# Patient Record
Sex: MALE | Race: BLACK OR AFRICAN AMERICAN | NOT HISPANIC OR LATINO | URBAN - METROPOLITAN AREA
[De-identification: names, ages, dates, MRNs, and addresses within clinical notes are randomized per-mention and may not be internally consistent; named-entity substitution may affect disease eponyms.]

---

## 2020-09-26 ENCOUNTER — INPATIENT (INPATIENT)
Facility: HOSPITAL | Age: 35
LOS: 1 days | Discharge: HOME | End: 2020-09-28
Attending: STUDENT IN AN ORGANIZED HEALTH CARE EDUCATION/TRAINING PROGRAM | Admitting: STUDENT IN AN ORGANIZED HEALTH CARE EDUCATION/TRAINING PROGRAM
Payer: MEDICAID

## 2020-09-26 VITALS
SYSTOLIC BLOOD PRESSURE: 138 MMHG | TEMPERATURE: 99 F | RESPIRATION RATE: 18 BRPM | OXYGEN SATURATION: 97 % | HEART RATE: 99 BPM | DIASTOLIC BLOOD PRESSURE: 80 MMHG

## 2020-09-26 DIAGNOSIS — M54.16 RADICULOPATHY, LUMBAR REGION: ICD-10-CM

## 2020-09-26 LAB
ALBUMIN SERPL ELPH-MCNC: 4.7 G/DL — SIGNIFICANT CHANGE UP (ref 3.5–5.2)
ALP SERPL-CCNC: 59 U/L — SIGNIFICANT CHANGE UP (ref 30–115)
ALT FLD-CCNC: 25 U/L — SIGNIFICANT CHANGE UP (ref 0–41)
ANION GAP SERPL CALC-SCNC: 8 MMOL/L — SIGNIFICANT CHANGE UP (ref 7–14)
APPEARANCE UR: ABNORMAL
AST SERPL-CCNC: 31 U/L — SIGNIFICANT CHANGE UP (ref 0–41)
BACTERIA # UR AUTO: NEGATIVE — SIGNIFICANT CHANGE UP
BASOPHILS # BLD AUTO: 0.06 K/UL — SIGNIFICANT CHANGE UP (ref 0–0.2)
BASOPHILS NFR BLD AUTO: 1 % — SIGNIFICANT CHANGE UP (ref 0–1)
BILIRUB SERPL-MCNC: 0.5 MG/DL — SIGNIFICANT CHANGE UP (ref 0.2–1.2)
BILIRUB UR-MCNC: NEGATIVE — SIGNIFICANT CHANGE UP
BUN SERPL-MCNC: 10 MG/DL — SIGNIFICANT CHANGE UP (ref 10–20)
CALCIUM SERPL-MCNC: 9.8 MG/DL — SIGNIFICANT CHANGE UP (ref 8.5–10.1)
CHLORIDE SERPL-SCNC: 105 MMOL/L — SIGNIFICANT CHANGE UP (ref 98–110)
CO2 SERPL-SCNC: 31 MMOL/L — SIGNIFICANT CHANGE UP (ref 17–32)
COLOR SPEC: YELLOW — SIGNIFICANT CHANGE UP
CREAT SERPL-MCNC: 1.1 MG/DL — SIGNIFICANT CHANGE UP (ref 0.7–1.5)
DIFF PNL FLD: NEGATIVE — SIGNIFICANT CHANGE UP
EOSINOPHIL # BLD AUTO: 0.09 K/UL — SIGNIFICANT CHANGE UP (ref 0–0.7)
EOSINOPHIL NFR BLD AUTO: 1.5 % — SIGNIFICANT CHANGE UP (ref 0–8)
EPI CELLS # UR: 1 /HPF — SIGNIFICANT CHANGE UP (ref 0–5)
ERYTHROCYTE [SEDIMENTATION RATE] IN BLOOD: 5 MM/HR — SIGNIFICANT CHANGE UP (ref 0–10)
GLUCOSE SERPL-MCNC: 87 MG/DL — SIGNIFICANT CHANGE UP (ref 70–99)
GLUCOSE UR QL: NEGATIVE — SIGNIFICANT CHANGE UP
HCT VFR BLD CALC: 46.9 % — SIGNIFICANT CHANGE UP (ref 42–52)
HGB BLD-MCNC: 15.2 G/DL — SIGNIFICANT CHANGE UP (ref 14–18)
HYALINE CASTS # UR AUTO: 17 /LPF — HIGH (ref 0–7)
IMM GRANULOCYTES NFR BLD AUTO: 0.2 % — SIGNIFICANT CHANGE UP (ref 0.1–0.3)
KETONES UR-MCNC: SIGNIFICANT CHANGE UP
LEUKOCYTE ESTERASE UR-ACNC: NEGATIVE — SIGNIFICANT CHANGE UP
LYMPHOCYTES # BLD AUTO: 1.7 K/UL — SIGNIFICANT CHANGE UP (ref 1.2–3.4)
LYMPHOCYTES # BLD AUTO: 27.6 % — SIGNIFICANT CHANGE UP (ref 20.5–51.1)
MCHC RBC-ENTMCNC: 30.7 PG — SIGNIFICANT CHANGE UP (ref 27–31)
MCHC RBC-ENTMCNC: 32.4 G/DL — SIGNIFICANT CHANGE UP (ref 32–37)
MCV RBC AUTO: 94.7 FL — HIGH (ref 80–94)
MONOCYTES # BLD AUTO: 0.63 K/UL — HIGH (ref 0.1–0.6)
MONOCYTES NFR BLD AUTO: 10.2 % — HIGH (ref 1.7–9.3)
NEUTROPHILS # BLD AUTO: 3.67 K/UL — SIGNIFICANT CHANGE UP (ref 1.4–6.5)
NEUTROPHILS NFR BLD AUTO: 59.5 % — SIGNIFICANT CHANGE UP (ref 42.2–75.2)
NITRITE UR-MCNC: NEGATIVE — SIGNIFICANT CHANGE UP
NRBC # BLD: 0 /100 WBCS — SIGNIFICANT CHANGE UP (ref 0–0)
PH UR: 6.5 — SIGNIFICANT CHANGE UP (ref 5–8)
PLATELET # BLD AUTO: 326 K/UL — SIGNIFICANT CHANGE UP (ref 130–400)
POTASSIUM SERPL-MCNC: 4.9 MMOL/L — SIGNIFICANT CHANGE UP (ref 3.5–5)
POTASSIUM SERPL-SCNC: 4.9 MMOL/L — SIGNIFICANT CHANGE UP (ref 3.5–5)
PROT SERPL-MCNC: 8.1 G/DL — HIGH (ref 6–8)
PROT UR-MCNC: ABNORMAL
RBC # BLD: 4.95 M/UL — SIGNIFICANT CHANGE UP (ref 4.7–6.1)
RBC # FLD: 12.1 % — SIGNIFICANT CHANGE UP (ref 11.5–14.5)
RBC CASTS # UR COMP ASSIST: 0 /HPF — SIGNIFICANT CHANGE UP (ref 0–4)
SODIUM SERPL-SCNC: 144 MMOL/L — SIGNIFICANT CHANGE UP (ref 135–146)
SP GR SPEC: 1.03 — HIGH (ref 1.01–1.03)
UROBILINOGEN FLD QL: ABNORMAL
WBC # BLD: 6.16 K/UL — SIGNIFICANT CHANGE UP (ref 4.8–10.8)
WBC # FLD AUTO: 6.16 K/UL — SIGNIFICANT CHANGE UP (ref 4.8–10.8)
WBC UR QL: 2 /HPF — SIGNIFICANT CHANGE UP (ref 0–5)

## 2020-09-26 PROCEDURE — 72125 CT NECK SPINE W/O DYE: CPT | Mod: 26

## 2020-09-26 PROCEDURE — 72131 CT LUMBAR SPINE W/O DYE: CPT | Mod: 26

## 2020-09-26 PROCEDURE — 99285 EMERGENCY DEPT VISIT HI MDM: CPT

## 2020-09-26 PROCEDURE — 72128 CT CHEST SPINE W/O DYE: CPT | Mod: 26

## 2020-09-26 RX ORDER — KETOROLAC TROMETHAMINE 30 MG/ML
30 SYRINGE (ML) INJECTION ONCE
Refills: 0 | Status: DISCONTINUED | OUTPATIENT
Start: 2020-09-26 | End: 2020-09-26

## 2020-09-26 RX ORDER — DEXAMETHASONE 0.5 MG/5ML
10 ELIXIR ORAL ONCE
Refills: 0 | Status: COMPLETED | OUTPATIENT
Start: 2020-09-26 | End: 2020-09-26

## 2020-09-26 RX ORDER — SODIUM CHLORIDE 9 MG/ML
1000 INJECTION INTRAMUSCULAR; INTRAVENOUS; SUBCUTANEOUS ONCE
Refills: 0 | Status: COMPLETED | OUTPATIENT
Start: 2020-09-26 | End: 2020-09-26

## 2020-09-26 RX ORDER — METHOCARBAMOL 500 MG/1
1500 TABLET, FILM COATED ORAL ONCE
Refills: 0 | Status: COMPLETED | OUTPATIENT
Start: 2020-09-26 | End: 2020-09-26

## 2020-09-26 RX ADMIN — Medication 30 MILLIGRAM(S): at 18:35

## 2020-09-26 RX ADMIN — SODIUM CHLORIDE 2000 MILLILITER(S): 9 INJECTION INTRAMUSCULAR; INTRAVENOUS; SUBCUTANEOUS at 18:35

## 2020-09-26 RX ADMIN — METHOCARBAMOL 1500 MILLIGRAM(S): 500 TABLET, FILM COATED ORAL at 22:00

## 2020-09-26 RX ADMIN — Medication 10 MILLIGRAM(S): at 22:00

## 2020-09-26 NOTE — ED PROVIDER NOTE - PROGRESS NOTE DETAILS
PP: Patient coming in with lumbar back pain, with sensory deficit to LLE, pending labs, CT. No saddle anesthesia and normal rectal tone. No hx of IVDU, back surgeries. Dispo as per results, reassessment, ambulation. Will sign patient out. Dr. Ferrera: Sign out to Dr. Huizar  Pt pending ct scan DL: Pt signed out to me by Dr. Adame. 33 yo M no reported PMHx presents to ED for acute shock like L sided lumbar back pain with decreased sensation to LLE. On my examination pt has TTP to L paraspinal muscles in lumbar region, as well as decreased sensation to LLE in L2 distrubution. Pending CT scan reads. ESR added to labs. DL: Spoke with Ishmael from neurosurgery, pt given decadron robaxin, will eval patient. Bhupendra: Received pt on signout. On reassesment, pt tender in the paraspinal lumbar area. Pt does complain of numbness in the L thigh area down to the knee. ESR and CT unremarkable. However, pt is still complaining of pain and numbness. Neurosurgery aware of pt. Will give pt robaxin and decadron.

## 2020-09-26 NOTE — ED PROVIDER NOTE - NS ED ROS FT
Review of Systems:  CONSTITUTIONAL - No fever  SKIN - No rash  HEMATOLOGIC - No abnormal bleeding or bruising  ENT - No neck pain  RESPIRATORY - No shortness of breath, No cough  CARDIAC -No chest pain, No palpitations  GI - No abdominal pain, No nausea, No vomiting, No diarrhea  - No dysuria, frequency, hematuria.  MUSCULOSKELETAL - No joint paint, No swelling  NEUROLOGIC - No numbness, No focal weakness, No headache, No dizziness  All other systems negative, unless specified in HPI

## 2020-09-26 NOTE — CONSULT NOTE ADULT - PROBLEM SELECTOR RECOMMENDATION 9
Decadron 10 mg IV x 1 now  Robaxin for spasm  Pt will need MRI of L/S spine w/o gadolinium  If pt refuses admission and is discharged he should go home on a Medrol dose juan and f/u with Dr Gilbert as outpt.

## 2020-09-26 NOTE — ED PROVIDER NOTE - PHYSICAL EXAMINATION
VITALS: Reviewed  CONSTITUTIONAL: well developed, well nourished, complaining of back pain, speaking in full sentences, nontoxic appearing  SKIN: warm, dry, no rash  HEAD: normocephalic, atraumatic  EYES: no conjunctival erythema, no nystagmus  ENT: patent airway, moist mucous membranes, no tongue deviation  NECK: supple, no masses, full flexion/extension without pain  CV:  regular rate, regular rhythm, 2+ radial pulses bilaterally  RESP: no wheezes, no rales, no rhonchi, normal work of breathing  ABD: soft, nontender, nondistended, no rebound, no guarding  MSK: normal ROM, no cyanosis, no edema, ++lumbar midspinal tenderness, no stepoffs  NEURO: alert, oriented, CN 2-12 grossly intact, sensation intact to light touch symmetrically EXCEPT LLE diminished, 5/5 motor strength in all extremities, normal finger to nose, normal rapid repetitive alternating movements, no pronator drift, no facial asymmetry, unable to assess gait due to pain   RECTAL: No saddle anesthesia, normal rectal tone (Chaperoned by Wanasingage Wanasingage PCA)   PSYCH: cooperative, appropriate

## 2020-09-26 NOTE — ED PROVIDER NOTE - ATTENDING CONTRIBUTION TO CARE
35 yo M presents to ED for back pain that started a few days ago. However, today he states the pain got worse. Today when he was walking he got a shooting pain in his back which caused him to fall to the ground. He has some decreased sensation to the L LE. No bladder or bowel incontinence or retention. Pt denies trauma.   No IVDU.  No other symptoms- SOB, CP, nausea, vomiting abdominal pain.     Const: Well nourished, well developed, appears stated age  Eyes: PERRL, no conjunctival injection  HENT:  Neck supple without meningismus   CV: RRR, Warm, well-perfused extremities  RESP: CTA B/L, no tachypnea   GI: soft, non-tender, non-distended  MSK: No gross deformities appreciated. Lumbar spinal and paraspinal pain.   Skin: Warm, dry. No rashes  Neuro: Alert, CNs II-XII grossly intact. Decreased sensation of L thigh     Will do labs, CT scan

## 2020-09-26 NOTE — ED PROVIDER NOTE - OBJECTIVE STATEMENT
34Y m with no sig PMH presents with lumbar back pain. Patient reports to having lumbar back pain for few days duration, unknown what triggered it, endorses to working out doing push ups, today while walking felt two stabbing sharp pain in the lumbar region 30 minutes prior to arrival, and he slowly fell to the ground on his knees. Endorses numbness to the LLE. Denies head trauma, no LOC, N/V. No hx of back surgery or IVDU. Denies fevers, chills, chest pain, SOB, abdominal pain, dysuria/hematuria. Subjectively denies saddle anesthesia.

## 2020-09-26 NOTE — ED ADULT NURSE NOTE - NSIMPLEMENTINTERV_GEN_ALL_ED
Implemented All Universal Safety Interventions:  Cooter to call system. Call bell, personal items and telephone within reach. Instruct patient to call for assistance. Room bathroom lighting operational. Non-slip footwear when patient is off stretcher. Physically safe environment: no spills, clutter or unnecessary equipment. Stretcher in lowest position, wheels locked, appropriate side rails in place.

## 2020-09-26 NOTE — ED PROVIDER NOTE - CLINICAL SUMMARY MEDICAL DECISION MAKING FREE TEXT BOX
34 yr old m that presents with back pain, and numbness. Labs and CT unremarkable. Pt evaluated by Neurosurgery whom recommended MRI. Pt admitted for MRI and pain management.

## 2020-09-26 NOTE — ED ADULT NURSE NOTE - OBJECTIVE STATEMENT
Pt. stated his back started hurting 3 days ago and got significantly worse today. C/o numbness down left leg. Denies urinary problems, cp, fever, chills.

## 2020-09-27 LAB
ANION GAP SERPL CALC-SCNC: 6 MMOL/L — LOW (ref 7–14)
BASOPHILS # BLD AUTO: 0.01 K/UL — SIGNIFICANT CHANGE UP (ref 0–0.2)
BASOPHILS NFR BLD AUTO: 0.1 % — SIGNIFICANT CHANGE UP (ref 0–1)
BUN SERPL-MCNC: 10 MG/DL — SIGNIFICANT CHANGE UP (ref 10–20)
CALCIUM SERPL-MCNC: 9.3 MG/DL — SIGNIFICANT CHANGE UP (ref 8.5–10.1)
CHLORIDE SERPL-SCNC: 105 MMOL/L — SIGNIFICANT CHANGE UP (ref 98–110)
CO2 SERPL-SCNC: 27 MMOL/L — SIGNIFICANT CHANGE UP (ref 17–32)
CREAT SERPL-MCNC: 0.8 MG/DL — SIGNIFICANT CHANGE UP (ref 0.7–1.5)
EOSINOPHIL # BLD AUTO: 0 K/UL — SIGNIFICANT CHANGE UP (ref 0–0.7)
EOSINOPHIL NFR BLD AUTO: 0 % — SIGNIFICANT CHANGE UP (ref 0–8)
GLUCOSE SERPL-MCNC: 143 MG/DL — HIGH (ref 70–99)
HCT VFR BLD CALC: 45.1 % — SIGNIFICANT CHANGE UP (ref 42–52)
HGB BLD-MCNC: 14.8 G/DL — SIGNIFICANT CHANGE UP (ref 14–18)
IMM GRANULOCYTES NFR BLD AUTO: 0.3 % — SIGNIFICANT CHANGE UP (ref 0.1–0.3)
LYMPHOCYTES # BLD AUTO: 0.8 K/UL — LOW (ref 1.2–3.4)
LYMPHOCYTES # BLD AUTO: 7.9 % — LOW (ref 20.5–51.1)
MAGNESIUM SERPL-MCNC: 2 MG/DL — SIGNIFICANT CHANGE UP (ref 1.8–2.4)
MCHC RBC-ENTMCNC: 31 PG — SIGNIFICANT CHANGE UP (ref 27–31)
MCHC RBC-ENTMCNC: 32.8 G/DL — SIGNIFICANT CHANGE UP (ref 32–37)
MCV RBC AUTO: 94.4 FL — HIGH (ref 80–94)
MONOCYTES # BLD AUTO: 0.2 K/UL — SIGNIFICANT CHANGE UP (ref 0.1–0.6)
MONOCYTES NFR BLD AUTO: 2 % — SIGNIFICANT CHANGE UP (ref 1.7–9.3)
NEUTROPHILS # BLD AUTO: 9.09 K/UL — HIGH (ref 1.4–6.5)
NEUTROPHILS NFR BLD AUTO: 89.7 % — HIGH (ref 42.2–75.2)
NRBC # BLD: 0 /100 WBCS — SIGNIFICANT CHANGE UP (ref 0–0)
PHOSPHATE SERPL-MCNC: 3.2 MG/DL — SIGNIFICANT CHANGE UP (ref 2.1–4.9)
PLATELET # BLD AUTO: 312 K/UL — SIGNIFICANT CHANGE UP (ref 130–400)
POTASSIUM SERPL-MCNC: 4.6 MMOL/L — SIGNIFICANT CHANGE UP (ref 3.5–5)
POTASSIUM SERPL-SCNC: 4.6 MMOL/L — SIGNIFICANT CHANGE UP (ref 3.5–5)
RBC # BLD: 4.78 M/UL — SIGNIFICANT CHANGE UP (ref 4.7–6.1)
RBC # FLD: 12 % — SIGNIFICANT CHANGE UP (ref 11.5–14.5)
SARS-COV-2 RNA SPEC QL NAA+PROBE: SIGNIFICANT CHANGE UP
SODIUM SERPL-SCNC: 138 MMOL/L — SIGNIFICANT CHANGE UP (ref 135–146)
WBC # BLD: 10.13 K/UL — SIGNIFICANT CHANGE UP (ref 4.8–10.8)
WBC # FLD AUTO: 10.13 K/UL — SIGNIFICANT CHANGE UP (ref 4.8–10.8)

## 2020-09-27 PROCEDURE — 99223 1ST HOSP IP/OBS HIGH 75: CPT | Mod: AI

## 2020-09-27 PROCEDURE — 72148 MRI LUMBAR SPINE W/O DYE: CPT | Mod: 26

## 2020-09-27 RX ORDER — OXYCODONE HYDROCHLORIDE 5 MG/1
10 TABLET ORAL EVERY 4 HOURS
Refills: 0 | Status: DISCONTINUED | OUTPATIENT
Start: 2020-09-27 | End: 2020-09-28

## 2020-09-27 RX ORDER — INFLUENZA VIRUS VACCINE 15; 15; 15; 15 UG/.5ML; UG/.5ML; UG/.5ML; UG/.5ML
0.5 SUSPENSION INTRAMUSCULAR ONCE
Refills: 0 | Status: COMPLETED | OUTPATIENT
Start: 2020-09-27 | End: 2020-09-27

## 2020-09-27 RX ORDER — METHOCARBAMOL 500 MG/1
750 TABLET, FILM COATED ORAL EVERY 8 HOURS
Refills: 0 | Status: DISCONTINUED | OUTPATIENT
Start: 2020-09-27 | End: 2020-09-28

## 2020-09-27 RX ORDER — CHLORHEXIDINE GLUCONATE 213 G/1000ML
1 SOLUTION TOPICAL
Refills: 0 | Status: DISCONTINUED | OUTPATIENT
Start: 2020-09-27 | End: 2020-09-28

## 2020-09-27 RX ORDER — HEPARIN SODIUM 5000 [USP'U]/ML
5000 INJECTION INTRAVENOUS; SUBCUTANEOUS EVERY 8 HOURS
Refills: 0 | Status: DISCONTINUED | OUTPATIENT
Start: 2020-09-27 | End: 2020-09-28

## 2020-09-27 RX ORDER — ACETAMINOPHEN 500 MG
650 TABLET ORAL EVERY 4 HOURS
Refills: 0 | Status: DISCONTINUED | OUTPATIENT
Start: 2020-09-27 | End: 2020-09-28

## 2020-09-27 RX ORDER — OXYCODONE HYDROCHLORIDE 5 MG/1
5 TABLET ORAL EVERY 4 HOURS
Refills: 0 | Status: DISCONTINUED | OUTPATIENT
Start: 2020-09-27 | End: 2020-09-28

## 2020-09-27 RX ADMIN — HEPARIN SODIUM 5000 UNIT(S): 5000 INJECTION INTRAVENOUS; SUBCUTANEOUS at 05:48

## 2020-09-27 RX ADMIN — METHOCARBAMOL 750 MILLIGRAM(S): 500 TABLET, FILM COATED ORAL at 21:26

## 2020-09-27 RX ADMIN — OXYCODONE HYDROCHLORIDE 5 MILLIGRAM(S): 5 TABLET ORAL at 12:47

## 2020-09-27 RX ADMIN — OXYCODONE HYDROCHLORIDE 10 MILLIGRAM(S): 5 TABLET ORAL at 23:58

## 2020-09-27 RX ADMIN — METHOCARBAMOL 750 MILLIGRAM(S): 500 TABLET, FILM COATED ORAL at 05:49

## 2020-09-27 RX ADMIN — METHOCARBAMOL 750 MILLIGRAM(S): 500 TABLET, FILM COATED ORAL at 13:18

## 2020-09-27 RX ADMIN — OXYCODONE HYDROCHLORIDE 10 MILLIGRAM(S): 5 TABLET ORAL at 17:08

## 2020-09-27 RX ADMIN — OXYCODONE HYDROCHLORIDE 10 MILLIGRAM(S): 5 TABLET ORAL at 17:38

## 2020-09-27 RX ADMIN — HEPARIN SODIUM 5000 UNIT(S): 5000 INJECTION INTRAVENOUS; SUBCUTANEOUS at 21:25

## 2020-09-27 RX ADMIN — HEPARIN SODIUM 5000 UNIT(S): 5000 INJECTION INTRAVENOUS; SUBCUTANEOUS at 13:18

## 2020-09-27 RX ADMIN — CHLORHEXIDINE GLUCONATE 1 APPLICATION(S): 213 SOLUTION TOPICAL at 05:48

## 2020-09-27 RX ADMIN — OXYCODONE HYDROCHLORIDE 5 MILLIGRAM(S): 5 TABLET ORAL at 13:07

## 2020-09-27 NOTE — PHYSICAL THERAPY INITIAL EVALUATION ADULT - BED MOBILITY LIMITATIONS, REHAB EVAL
impaired ability to control trunk for mobility/2/2 pain/decreased ability to use legs for bridging/pushing

## 2020-09-27 NOTE — PHYSICAL THERAPY INITIAL EVALUATION ADULT - GENERAL OBSERVATIONS, REHAB EVAL
pt was seen for PT IE 11:05-11:35 Pt ws pleasant and agreeable to PT.  PT c/o strong pain in the low back and L LE numbness.  Pt received and left semi alejandre in bed, in no apparent distress. +call bell within reach,

## 2020-09-27 NOTE — H&P ADULT - ATTENDING COMMENTS
35 YO M with no significant PMH who presents to the hospital with a c/o lower back pain described as sharp and radiating to his LLE. Associated with numbness of the lateral aspect of the LLE. Pt does state he was working out [id number], also is a  who frequently lifts heavy objects. Denies any trauma/falls, bowel/bladder incontinence, or saddle paresthesia. In the ED, CT-CTL spine showed mild degenerative changes of the left L3-L4 facet join. Neurosurgery consulted and wants IV decadron and MRI performed.     Physical exam shows pt in NAD. VSS, afebrile, not hypoxic on RA. A&Ox3. Non-focal neuro exam. Muscle strength intact, mild decreased sensation noted over lateral aspect of LLE. SLR is negative. Gait exam deferred. CTA B/L with no W/C/R. RRR, no M/G/R. ABD is soft and non-tender, normoactive BSs. LEs without swelling. No rashes. Labs and radiology as above.     Acute lower back pain + lateral LLE numbness, likely radiculopathy vs sciatica; doubt cord compression. Neurosurgery is following. NSAIDs. Lidocaine patch. Robaxin. PT consult. MRI. Fall precautions.    Restart home meds. DVT PPX. Inform PCP of pt's admission to hospital. My note supersedes the residents note.

## 2020-09-27 NOTE — CHART NOTE - NSCHARTNOTEFT_GEN_A_CORE
Sarah Eugene MD  Hospitalist   Spectra: [phone]    Patient is a 34y old  Male who presents with a chief complaint of Back pain (27 Sep 2020 00:40)      INTERVAL HPI/OVERNIGHT EVENTS: seen comfortably lying in bed, reports lower back pain more on left then right     REVIEW OF SYSTEMS: negative  Vital Signs Last 24 Hrs  T(C): 36.4 (27 Sep 2020 13:32), Max: 37.3 (26 Sep 2020 17:44)  T(F): 97.6 (27 Sep 2020 13:32), Max: 99.2 (26 Sep 2020 17:44)  HR: 65 (27 Sep 2020 13:32) (61 - 99)  BP: 132/82 (27 Sep 2020 13:32) (119/60 - 138/80)  RR: 18 (27 Sep 2020 13:32) (18 - 20)  SpO2: 98% (27 Sep 2020 05:50) (97% - 99%)    PHYSICAL EXAM:   NAD; Normocephalic;   LUNGS - no wheezing  HEART: S1 S2+   ABDOMEN: Soft, Nontender, non distended  EXTREMITIES: no cyanosis; no edema  NERVOUS SYSTEM:  Awake and alert; no focal neuro deficits appreciated    LABS:                        14.8   10.13 )-----------( 312      ( 27 Sep 2020 06:03 )             45.1     09-    138  |  105  |  10  ----------------------------<  143<H>  4.6   |  27  |  0.8    Ca    9.3      27 Sep 2020 06:03  Phos  3.2     09-  Mg     2.0     -    TPro  8.1<H>  /  Alb  4.7  /  TBili  0.5  /  DBili  x   /  AST  31  /  ALT  25  /  AlkPhos  59  09-26      Urinalysis Basic - ( 26 Sep 2020 18:40 )    Color: Yellow / Appearance: Slightly Turbid / S.034 / pH: x  Gluc: x / Ketone: Trace  / Bili: Negative / Urobili: 3 mg/dL   Blood: x / Protein: 30 mg/dL / Nitrite: Negative   Leuk Esterase: Negative / RBC: 0 /HPF / WBC 2 /HPF   Sq Epi: x / Non Sq Epi: 1 /HPF / Bacteria: Negative      CAPILLARY BLOOD GLUCOSE          A/P:-  Pt is seen and evaluated by bedside.   1. Lower back pain sciatica vs radiculopathy    - no neuro deficit noted   - pain L> R   - STR positive   - S/p dose of decadron as per surgery   - follow MRI results   - pain control   - Physical therapy      Plan of care was discussed with patient ; all questions and concerns were addressed and care was aligned with patient's wishes.

## 2020-09-27 NOTE — H&P ADULT - NSHPLABSRESULTS_GEN_ALL_CORE
===================== LABS =====================                        15.2   6.16  )-----------( 326      ( 26 Sep 2020 18:40 )             46.9         144  |  105  |  10  ----------------------------<  87  4.9   |  31  |  1.1    Ca    9.8      26 Sep 2020 18:40    TPro  8.1<H>  /  Alb  4.7  /  TBili  0.5  /  DBili  x   /  AST  31  /  ALT  25  /  AlkPhos  59        Urinalysis Basic - ( 26 Sep 2020 18:40 )    Color: Yellow / Appearance: Slightly Turbid / S.034 / pH: x  Gluc: x / Ketone: Trace  / Bili: Negative / Urobili: 3 mg/dL   Blood: x / Protein: 30 mg/dL / Nitrite: Negative   Leuk Esterase: Negative / RBC: 0 /HPF / WBC 2 /HPF   Sq Epi: x / Non Sq Epi: 1 /HPF / Bacteria: Negative    Sedimentation Rate, Erythrocyte: 5 mm/Hr (20 @ 20:15)    ================== IMAGING ==================  < from: CT Thoracic Spine No Cont (20 @ 20:41) >    Impression:    No acute fracture or dislocation of the cervical, thoracic, or lumbar spine.

## 2020-09-27 NOTE — H&P ADULT - ASSESSMENT
=================== OBJECTIVE ===================    VITAL SIGNS: Last 24 Hours  T(C): 36.2 (26 Sep 2020 23:51), Max: 37.3 (26 Sep 2020 17:44)  T(F): 97.2 (26 Sep 2020 23:51), Max: 99.2 (26 Sep 2020 17:44)  HR: 74 (26 Sep 2020 23:51) (74 - 99)  BP: 129/64 (26 Sep 2020 23:51) (129/64 - 138/80)  BP(mean): --  RR: 20 (26 Sep 2020 23:51) (18 - 20)  SpO2: 99% (26 Sep 2020 23:51) (97% - 99%)    PHYSICAL EXAM:  GENERAL: NAD, well-developed  HEAD:  Atraumatic, Normocephalic  EYES: EOMI, PERRLA, conjunctiva and sclera clear  NECK: Supple, No JVD  CHEST/LUNG: Clear to auscultation bilaterally; No wheeze  HEART: Regular rate and rhythm; No murmurs, rubs, or gallops  ABDOMEN: Soft, Nontender, Nondistended; Bowel sounds present  EXTREMITIES:  2+ Peripheral Pulses, No clubbing, cyanosis, or edema  PSYCH: AAOx3  NEUROLOGY: non-focal  General:  Appearance is consistent with chronologic age.  No abnormal facies.   General: AA&Ox3.  Fund of knowledge is intact and normal.  Language with normal repetition, comprehension and naming.  Nondysarthric.    Cranial nerves: intact VA, VFF.  EOMI w/o nystagmus, skew or reported double vision.  PERRL.  No ptosis/weakness of eyelid closure.  Facial sensation is normal with normal bite.  No facial asymmetry.  Hearing grossly intact b/l.  Palate elevates midline.  Tongue midline.  Motor examination:   Normal tone, bulk and range of motion. ++right hand tremor     Formal Muscle Strength Testing: (MRC grade R/L) 5/5 UE; 5/5 LE.  No observable drift. ++ rigidity on circular wrist maneuver and on circular elbow maneuver. - Tinel test    Reflexes:   2+ b/l biceps and brachioradialis.   Sensory examination:   Intact to light touch and pinprick, pain, temperature and proprioception and vibration in all extremities.  Cerebellum:   FTN intact with normal TASNEEM in all limbs.  No dysmetria or dysdiadokinesia.    Gait: ambulates with walker, ++ asymmetric steps, decreased heel strike   SKIN: No rashes or lesions      ===================== LABS =====================                        15.2   6.16  )-----------( 326      ( 26 Sep 2020 18:40 )             46.9         144  |  105  |  10  ----------------------------<  87  4.9   |  31  |  1.1    Ca    9.8      26 Sep 2020 18:40    TPro  8.1<H>  /  Alb  4.7  /  TBili  0.5  /  DBili  x   /  AST  31  /  ALT  25  /  AlkPhos  59        Urinalysis Basic - ( 26 Sep 2020 18:40 )    Color: Yellow / Appearance: Slightly Turbid / S.034 / pH: x  Gluc: x / Ketone: Trace  / Bili: Negative / Urobili: 3 mg/dL   Blood: x / Protein: 30 mg/dL / Nitrite: Negative   Leuk Esterase: Negative / RBC: 0 /HPF / WBC 2 /HPF   Sq Epi: x / Non Sq Epi: 1 /HPF / Bacteria: Negative        Sedimentation Rate, Erythrocyte: 5 mm/Hr (20 @ 20:15)        ================= MICROBIOLOGY ================    ================== IMAGING ==================    ================== OTHER SIGNIFICANT FINDINGS ==================    ================== PROCEDURES ==================    ================== EKG ==================    ================= ASSESSMENT/PLAN ==================  Patient is a 34y old Male who presents with a chief complaint of Back pain (27 Sep 2020 00:40)  Currently admitted to medicine with the primary diagnosis of Lumbar pain    # Acute lumbar radiculopathy - Muscle sprain, vs sciatica   No evidence of chord compression or neurological deficit   s/p 1x decadron 10   PLAN  - MRI LS spine non contrast  - Hold decadron for now unless change in neuro exam   - Neurochecks   - Pain control PRN   - Robaxin   - PT/Rehab   - Neurosurgery follow up        GI PPX: -   DVT PPX: Heparin     DIET: Regular   ACTIVITY:  () Ad Coby  /  (X) Advance as Tolerated  /  () Bed Rest  /  () Fall Precaution  /  () Seizure precaution    ================= DISPOSITION ==================    Patient to be discharged when condition(s) optimized.            Discharge to: Home when cleared             Home services:              Counseled on/Discussed cessation of:  () Risky behaviors  /  () Smoking cessation  /  () Diet  /  () Exercise  /  () Other    ================= CODE STATUS =================                  (X) FULL CODE     |     () DNR     |     () DNI    () Discussion with patient and/or family regarding goals of care       ================= ASSESSMENT/PLAN ==================  Patient is a 34y old Male who presents with a chief complaint of Back pain (27 Sep 2020 00:40)  Currently admitted to medicine with the primary diagnosis of Lumbar pain    # Acute lumbar radiculopathy - Muscle sprain, vs sciatica   No evidence of chord compression or neurological deficit   s/p 1x decadron 10   PLAN  - MRI LS spine non contrast  - Hold decadron for now unless change in neuro exam   - Neurochecks   - Pain control PRN   - Robaxin   - PT/Rehab   - Neurosurgery follow up        GI PPX: -   DVT PPX: Heparin     DIET: Regular   ACTIVITY:  () Ad Coby  /  (X) Advance as Tolerated  /  () Bed Rest  /  () Fall Precaution  /  () Seizure precaution    ================= DISPOSITION ==================    Patient to be discharged when condition(s) optimized.            Discharge to: Home when cleared             Home services:              Counseled on/Discussed cessation of:  () Risky behaviors  /  () Smoking cessation  /  () Diet  /  () Exercise  /  () Other    ================= CODE STATUS =================                  (X) FULL CODE     |     () DNR     |     () DNI    () Discussion with patient and/or family regarding goals of care

## 2020-09-27 NOTE — H&P ADULT - HISTORY OF PRESENT ILLNESS
[34 year old man]    CC: Back Pain     PMH: Denies     34Y m with no sig PMH presents with lumbar back pain. Patient reports to having lumbar back pain for few days duration, unknown what triggered it, endorses to working out doing push ups, today while walking felt two stabbing sharp pain in the lumbar region 30 minutes prior to arrival, and he slowly fell to the ground on his knees. Endorses numbness to the LLE. Denies head trauma, no LOC, N/V. No hx of back surgery or IVDU. Denies fevers, chills, chest pain, SOB, abdominal pain, dysuria/hematuria. Subjectively denies saddle anesthesia.    In the ED, vitals were stable. CT scan was negative for acute pathology. Labs were not remarkable

## 2020-09-27 NOTE — PHYSICAL THERAPY INITIAL EVALUATION ADULT - PERTINENT HX OF CURRENT PROBLEM, REHAB EVAL
34Y m with no sig PMH presents with lumbar back pain. Patient reports to having lumbar back pain for few days duration, unknown what triggered it, endorses to working out doing push ups, today while walking felt two stabbing sharp pain in the lumbar region 30 minutes prior to arrival, and he slowly fell to the ground on his knees. Endorses numbness to the LLE.

## 2020-09-28 VITALS — DIASTOLIC BLOOD PRESSURE: 76 MMHG | HEART RATE: 84 BPM | SYSTOLIC BLOOD PRESSURE: 157 MMHG | TEMPERATURE: 98 F

## 2020-09-28 PROCEDURE — 99233 SBSQ HOSP IP/OBS HIGH 50: CPT | Mod: GC

## 2020-09-28 RX ORDER — OXYCODONE HYDROCHLORIDE 5 MG/1
1 TABLET ORAL
Qty: 9 | Refills: 0
Start: 2020-09-28 | End: 2020-09-30

## 2020-09-28 RX ORDER — GABAPENTIN 400 MG/1
100 CAPSULE ORAL THREE TIMES A DAY
Refills: 0 | Status: DISCONTINUED | OUTPATIENT
Start: 2020-09-28 | End: 2020-09-28

## 2020-09-28 RX ORDER — GABAPENTIN 400 MG/1
1 CAPSULE ORAL
Qty: 90 | Refills: 0
Start: 2020-09-28 | End: 2020-10-27

## 2020-09-28 RX ORDER — GABAPENTIN 400 MG/1
1 CAPSULE ORAL
Qty: 45 | Refills: 0
Start: 2020-09-28 | End: 2020-10-12

## 2020-09-28 RX ORDER — METHOCARBAMOL 500 MG/1
750 TABLET, FILM COATED ORAL EVERY 8 HOURS
Refills: 0 | Status: DISCONTINUED | OUTPATIENT
Start: 2020-09-28 | End: 2020-09-28

## 2020-09-28 RX ADMIN — HEPARIN SODIUM 5000 UNIT(S): 5000 INJECTION INTRAVENOUS; SUBCUTANEOUS at 05:40

## 2020-09-28 RX ADMIN — METHOCARBAMOL 750 MILLIGRAM(S): 500 TABLET, FILM COATED ORAL at 13:04

## 2020-09-28 RX ADMIN — OXYCODONE HYDROCHLORIDE 5 MILLIGRAM(S): 5 TABLET ORAL at 11:15

## 2020-09-28 RX ADMIN — CHLORHEXIDINE GLUCONATE 1 APPLICATION(S): 213 SOLUTION TOPICAL at 05:39

## 2020-09-28 RX ADMIN — METHOCARBAMOL 750 MILLIGRAM(S): 500 TABLET, FILM COATED ORAL at 05:39

## 2020-09-28 RX ADMIN — HEPARIN SODIUM 5000 UNIT(S): 5000 INJECTION INTRAVENOUS; SUBCUTANEOUS at 13:04

## 2020-09-28 RX ADMIN — GABAPENTIN 100 MILLIGRAM(S): 400 CAPSULE ORAL at 13:04

## 2020-09-28 NOTE — PROGRESS NOTE ADULT - SUBJECTIVE AND OBJECTIVE BOX
Neurosurgery PA Note    Pt seen at bedside. Pt states he experiences LBP that intermittently radiates into his LLE which was improved by oxycodone but not by the last pain medication he had received. Pt becomes agitated stating that his pain medication should not have been changed and he only receives relief from oxycodone. I explained to the patient that i would discuss this with his primary team who prescribes him his pain medication. Pt admits to intermittent numbness to his LLE. Pt has been OOB and ambulates.     Vital Signs Last 24 Hrs  T(C): 36.5 (28 Sep 2020 14:44), Max: 36.8 (27 Sep 2020 21:19)  T(F): 97.7 (28 Sep 2020 14:44), Max: 98.2 (27 Sep 2020 21:19)  HR: 84 (28 Sep 2020 14:44) (55 - 94)  BP: 157/76 (28 Sep 2020 14:44) (116/52 - 157/76)  BP(mean): --  RR: 18 (28 Sep 2020 05:25) (18 - 18)  SpO2: --                                                   PE: Pt adamantly refusing neurological exam states he already underwent that exam today and is not doing it again. I explained to the patient that I am from a different speciality ( from neurosurgery) and its important for me to asses his neurological status patient understands this but continues to refuse     LABS:                        14.8   10.13 )-----------( 312      ( 27 Sep 2020 06:03 )             45.1     09-27    138  |  105  |  10  ----------------------------<  143<H>  4.6   |  27  |  0.8    Ca    9.3      27 Sep 2020 06:03  Phos  3.2     09-27  Mg     2.0     09-27      MRI L-spine: MPRESSION:    1.  Motion limited study.    2.  No gross evidence of acute traumatic injury involving the lumbar spine.    3.  Multilevel facet arthropathy with moderate foraminal stenosis at L3-4 (greater on the left) and L4-5 (greater on the right).      a/p: 34M with LLE radiculopathy, MRI with moderate stenosis at L3-4, L4-5     1. MRI reviewed with Dr. Edge , no surgical intervention warranted at this time. Recommend outpatient follow up with Dr. Gilbert [phone]. prn pain control/PT. Discussed with patients primary team inregard to patients concerns about his current pain regimen.     above D/w Dr. Edge

## 2020-09-28 NOTE — DISCHARGE NOTE PROVIDER - HOSPITAL COURSE
34Y m with no sig PMH presents with lumbar back pain. Patient reports to having lumbar back pain for few days duration, unknown what triggered it, endorses to working out doing push ups, today while walking felt two stabbing sharp pain in the lumbar region 30 minutes prior to arrival, and he slowly fell to the ground on his knees. Endorses numbness to the LLE. Denies head trauma, no LOC, N/V. No hx of back surgery or IVDU. Denies fevers, chills, chest pain, SOB, abdominal pain, dysuria/hematuria. Subjectively denies saddle anesthesia. Patient was found to have Mild degenerative changes of the left L3-L4 on CT, and Multilevel facet arthropathy with moderate foraminal stenosis at L3-4 (greater on the left) and L4-5 (greater on the right) on MRI.    34Y m with no sig PMH presents with lumbar back pain. Patient reports to having lumbar back pain for few days duration, unknown what triggered it, endorses to working out doing push ups, today while walking felt two stabbing sharp pain in the lumbar region 30 minutes prior to arrival, and he slowly fell to the ground on his knees. Endorses numbness to the LLE. Denies head trauma, no LOC, N/V. No hx of back surgery or IVDU. Denies fevers, chills, chest pain, SOB, abdominal pain, dysuria/hematuria. Subjectively denies saddle anesthesia. Patient was found to have Mild degenerative changes of the left L3-L4 on CT, and Multilevel facet arthropathy with moderate foraminal stenosis at L3-4 (greater on the left) and L4-5 (greater on the right) on MRI. Neurosurgery suggested no surgical intervention. He is medically stable for discharge.

## 2020-09-28 NOTE — PROGRESS NOTE ADULT - SUBJECTIVE AND OBJECTIVE BOX
Sarah Eugene MD  Hospitalist   Spectra: [phone]    Patient is a 34y old  Male who presents with a chief complaint of Back pain (28 Sep 2020 11:54)      INTERVAL HPI/OVERNIGHT EVENTS: reports intermittent pain over lower back radiating to legs - relived by pain meds     REVIEW OF SYSTEMS: negative  Vital Signs Last 24 Hrs  T(C): 35.9 (28 Sep 2020 05:25), Max: 36.8 (27 Sep 2020 21:19)  T(F): 96.6 (28 Sep 2020 05:25), Max: 98.2 (27 Sep 2020 21:19)  HR: 55 (28 Sep 2020 05:25) (55 - 94)  BP: 116/52 (28 Sep 2020 05:25) (116/52 - 139/73)  RR: 18 (28 Sep 2020 05:25) (18 - 18)    PHYSICAL EXAM:   NAD; Normocephalic;   LUNGS - no wheezing  HEART: S1 S2+   ABDOMEN: Soft, Nontender, non distended  EXTREMITIES: no cyanosis; no edema  NERVOUS SYSTEM:  Awake and alert; no focal neuro deficits appreciated    LABS:                        14.8   10.13 )-----------( 312      ( 27 Sep 2020 06:03 )             45.1     09-27    138  |  105  |  10  ----------------------------<  143<H>  4.6   |  27  |  0.8    Ca    9.3      27 Sep 2020 06:03  Phos  3.2     09-  Mg     2.0     09-27    TPro  8.1<H>  /  Alb  4.7  /  TBili  0.5  /  DBili  x   /  AST  31  /  ALT  25  /  AlkPhos  59  09-26      Urinalysis Basic - ( 26 Sep 2020 18:40 )    Color: Yellow / Appearance: Slightly Turbid / S.034 / pH: x  Gluc: x / Ketone: Trace  / Bili: Negative / Urobili: 3 mg/dL   Blood: x / Protein: 30 mg/dL / Nitrite: Negative   Leuk Esterase: Negative / RBC: 0 /HPF / WBC 2 /HPF   Sq Epi: x / Non Sq Epi: 1 /HPF / Bacteria: Negative

## 2020-09-28 NOTE — DISCHARGE NOTE NURSING/CASE MANAGEMENT/SOCIAL WORK - PATIENT PORTAL LINK FT
You can access the FollowMyHealth Patient Portal offered by [facility] by registering at the following website: [email or web address]. By joining [employer]’s FollowMyHealth portal, you will also be able to view your health information using other applications (apps) compatible with our system.

## 2020-09-28 NOTE — PROGRESS NOTE ADULT - ASSESSMENT
A/P:-  Pt is seen and evaluated by bedside.   1. Lower back pain  2. multilevel lumbar foraminal stenosis     - Mr lumber spine - Multilevel facet arthropathy with moderate foraminal stenosis at L3-4 (greater on the left) and L4-5 (greater on the right).  - no neuro deficit noted   - pain L> R   - STR positive   - S/p dose of decadron as per surgery   - pain control   - neuro surgery follow up with results - if no inpatient intervention then discharge planning home with oral pain medications   - Physical therapy      Plan of care was discussed with patient ; all questions and concerns were addressed and care was aligned with patient's wishes.   A/P:-  Pt is seen and evaluated by bedside.   1. Lower back pain - acute   2. multilevel lumbar foraminal stenosis     - Mr lumber spine - Multilevel facet arthropathy with moderate foraminal stenosis at L3-4 (greater on the left) and L4-5 (greater on the right).  - no neuro deficit noted   - pain L> R   - STR positive   - S/p dose of decadron as per surgery   - pain control   - neuro surgery follow up with results - if no inpatient intervention then discharge planning home with oral pain medications   - Physical therapy      Plan of care was discussed with patient ; all questions and concerns were addressed and care was aligned with patient's wishes.

## 2020-09-28 NOTE — DISCHARGE NOTE PROVIDER - CARE PROVIDER_API CALL
Jessica Gilbert)  Neurosurgery  [address], [address]  [place] [address]  Phone: [phone]  Fax: [phone]  Follow Up Time: 2 weeks

## 2020-09-28 NOTE — DISCHARGE NOTE PROVIDER - NSDCCPCAREPLAN_GEN_ALL_CORE_FT
PRINCIPAL DISCHARGE DIAGNOSIS  Diagnosis: Lumbar pain  Assessment and Plan of Treatment: Back Pain  Back pain is very common in adults. The cause of back pain is rarely dangerous and the pain often gets better over time. The cause of your back pain may not be known and may include strain of muscles or ligaments, degeneration of the spinal disks, or arthritis. Occasionally the pain may radiate down your leg(s). Over-the-counter medicines to reduce pain and inflammation are often the most helpful. Stretching and remaining active frequently helps the healing process.   SEEK IMMEDIATE MEDICAL CARE IF YOU HAVE ANY OF THE FOLLOWING SYMPTOMS: bowel or bladder control problems, unusual weakness or numbness in your arms or legs, nausea or vomiting, abdominal pain, fever, dizziness/lightheadedness.        SECONDARY DISCHARGE DIAGNOSES  Diagnosis: Abnormal sensation of leg  Assessment and Plan of Treatment:      PRINCIPAL DISCHARGE DIAGNOSIS  Diagnosis: Lumbar pain  Assessment and Plan of Treatment: Back Pain  Your imaging reveals formainal stenosis. Neurosurgery recommended to follow up with Dr. Gilbert in 2 weeks.   Back pain is very common in adults. The cause of back pain is rarely dangerous and the pain often gets better over time. The cause of your back pain may not be known and may include strain of muscles or ligaments, degeneration of the spinal disks, or arthritis. Occasionally the pain may radiate down your leg(s). Over-the-counter medicines to reduce pain and inflammation are often the most helpful. Stretching and remaining active frequently helps the healing process.   SEEK IMMEDIATE MEDICAL CARE IF YOU HAVE ANY OF THE FOLLOWING SYMPTOMS: bowel or bladder control problems, unusual weakness or numbness in your arms or legs, nausea or vomiting, abdominal pain, fever, dizziness/lightheadedness.        SECONDARY DISCHARGE DIAGNOSES  Diagnosis: Abnormal sensation of leg  Assessment and Plan of Treatment:

## 2020-10-02 DIAGNOSIS — F17.210 NICOTINE DEPENDENCE, CIGARETTES, UNCOMPLICATED: ICD-10-CM

## 2020-10-02 DIAGNOSIS — M12.9 ARTHROPATHY, UNSPECIFIED: ICD-10-CM

## 2020-10-02 DIAGNOSIS — M54.16 RADICULOPATHY, LUMBAR REGION: ICD-10-CM

## 2020-10-02 DIAGNOSIS — M48.061 SPINAL STENOSIS, LUMBAR REGION WITHOUT NEUROGENIC CLAUDICATION: ICD-10-CM

## 2023-08-10 NOTE — CONSULT NOTE ADULT - SUBJECTIVE AND OBJECTIVE BOX
HPI:  [34 year old man]    CC: Back Pain     PMH: Denies     34Y m with no sig PMH presents with lumbar back pain. Patient reports to having lumbar back pain for few days duration, unknown what triggered it, endorses to working out doing push ups, today while walking felt two stabbing sharp pain in the lumbar region 30 minutes prior to arrival, and he slowly fell to the ground on his knees. Endorses numbness to the LLE. Denies head trauma, no LOC, N/V. No hx of back surgery or IVDU. Denies fevers, chills, chest pain, SOB, abdominal pain, dysuria/hematuria. Subjectively denies saddle anesthesia.    In the ED, vitals were stable. CT scan was negative for acute pathology. Labs were not remarkable  (27 Sep 2020 00:40)      T(C): 36.4 (09-27-20 @ 13:32), Max: 37.3 (09-26-20 @ 17:44)  HR: 65 (09-27-20 @ 13:32) (61 - 99)  BP: 132/82 (09-27-20 @ 13:32) (119/60 - 138/80)  RR: 18 (09-27-20 @ 13:32) (18 - 20)  SpO2: 98% (09-27-20 @ 05:50) (97% - 99%)    MOTOR EXAM      Physical Medicine And Rehabilitation Services are not indicated in this patient for the following Reason(s):    [    ] Patient is medically unstable      [    ]  Patient does not have appropriate activity orders      [     ] Patient has no weight bearing status for:      [     ] Patient is independently ambulating      [     ]  Patient is from [facility] and is appropriate to return      [     ] Patient was non-ambulatory prior to admission      [ x    ]  Other pt seen by pt is able to walk without devices      [facility] PM&R /
35 yo male c/o LBP x 2 days radiating to left LE with numbness of left lateral thigh and calf. Pt denies any trauma but works as a  lifting heavy objects. Pt is able to ambulate per pt. No urinary symptoms, no saddle anesthesia.      Allergies    No Known Allergies    Vital Signs Last 24 Hrs  T(C): 37.3 (26 Sep 2020 17:44), Max: 37.3 (26 Sep 2020 17:44)  T(F): 99.2 (26 Sep 2020 17:44), Max: 99.2 (26 Sep 2020 17:44)  HR: 99 (26 Sep 2020 17:44) (99 - 99)  BP: 138/80 (26 Sep 2020 17:44) (138/80 - 138/80)  BP(mean): --  RR: 18 (26 Sep 2020 17:44) (18 - 18)  SpO2: 97% (26 Sep 2020 17:44) (97% - 97%)    PHYSICAL EXAM:    GENERAL: NAD, well-groomed, well-developed  HEAD:  Atraumatic, Normocephalic  EYES: EOMI, PERRLA, conjunctiva and sclera clear  NERVOUS SYSTEM:  Awake  alert oriented to self, place situation   Fund of knowledge, recent and remote memory are intact   Mood; normal affect   CN II-XII intact PERRRL, EOMI, no ptosis, no Nystagmus, normal shoulder shrug   Face symmetrical tongue is midline speech is clear and fluent  Motor: 5/5 UE/LE all muscle groups   Sensory: Decreased sensation left lateral thigh and left lateral calf. No  foot drop  Gait is not tested      EXTREMITIES:  2+ Peripheral Pulses, No clubbing, cyanosis, or edema      LABS:                        15.2   6.16  )-----------( 326      ( 26 Sep 2020 18:40 )             46.9         144  |  105  |  10  ----------------------------<  87  4.9   |  31  |  1.1    Ca    9.8      26 Sep 2020 18:40    TPro  8.1<H>  /  Alb  4.7  /  TBili  0.5  /  DBili  x   /  AST  31  /  ALT  25  /  AlkPhos  59        Urinalysis Basic - ( 26 Sep 2020 18:40 )    Color: Yellow / Appearance: Slightly Turbid / S.034 / pH: x  Gluc: x / Ketone: Trace  / Bili: Negative / Urobili: 3 mg/dL   Blood: x / Protein: 30 mg/dL / Nitrite: Negative   Leuk Esterase: Negative / RBC: 0 /HPF / WBC 2 /HPF   Sq Epi: x / Non Sq Epi: 1 /HPF / Bacteria: Negative        RADIOLOGY & ADDITIONAL TESTS:  ct< from: CT Lumbar Spine No Cont (20 @ 20:40) >  EXAM:  CT LUMBAR SPINE        EXAM:  CT THORACIC SPINE        EXAM:  CT CERVICAL SPINE            PROCEDURE DATE:  2020            INTERPRETATION:  Clinical History / Reason for exam: Trauma.    Clinical History/Reason For Exam: Back Pain. Loss of sensation.    Technique: CT CERVICAL SPINE, CT THORACIC SPINE, CT LUMBAR SPINE, without intravenous contrast.    Comparison: None.    Findings:    Straightening of the normal cervical lordosis may be secondary to patient positioning versus muscular spasm.    Slight degenerative hypertrophy of the anterior ring of C1 is present.    No acute fracture or dislocation is seen.  Vertebral body height is grossly preserved.  There is no listhesis.    The dens is intact.  There is no prevertebral soft tissue swelling.  There is no CT evidence of significant spinal canal or neural foraminal narrowing.    The visualized brain parenchyma is unremarkable.    The thyroid gland is unremarkable.  The lung apices are clear.    Limited noncontrast evaluation of the intrathoracic and intra-abdominal soft tissues unremarkable.    Mild degenerative changes of the left L3-L4 facet joint (series 8, image 72).    Impression:    No acute fracture or dislocation of the cervical, thoracic, or lumbar spine.    < end of copied text >  
Attending Attestation (For Attendings USE Only)...